# Patient Record
Sex: FEMALE | Race: ASIAN | NOT HISPANIC OR LATINO | Employment: UNEMPLOYED | URBAN - METROPOLITAN AREA
[De-identification: names, ages, dates, MRNs, and addresses within clinical notes are randomized per-mention and may not be internally consistent; named-entity substitution may affect disease eponyms.]

---

## 2020-07-01 ENCOUNTER — OFFICE VISIT (OUTPATIENT)
Dept: OBSTETRICS AND GYNECOLOGY | Facility: CLINIC | Age: 60
End: 2020-07-01
Payer: COMMERCIAL

## 2020-07-01 VITALS
HEIGHT: 59 IN | SYSTOLIC BLOOD PRESSURE: 121 MMHG | WEIGHT: 113 LBS | BODY MASS INDEX: 22.78 KG/M2 | DIASTOLIC BLOOD PRESSURE: 87 MMHG

## 2020-07-01 DIAGNOSIS — R56.9 SEIZURES: ICD-10-CM

## 2020-07-01 DIAGNOSIS — R73.03 PRE-DIABETES: ICD-10-CM

## 2020-07-01 DIAGNOSIS — Z78.0 MENOPAUSE: ICD-10-CM

## 2020-07-01 DIAGNOSIS — I63.9 CEREBROVASCULAR ACCIDENT (CVA), UNSPECIFIED MECHANISM: ICD-10-CM

## 2020-07-01 DIAGNOSIS — Z01.419 ENCOUNTER FOR WELL WOMAN EXAM WITH ROUTINE GYNECOLOGICAL EXAM: Primary | ICD-10-CM

## 2020-07-01 DIAGNOSIS — Z12.31 BREAST CANCER SCREENING BY MAMMOGRAM: ICD-10-CM

## 2020-07-01 DIAGNOSIS — I69.911 MEMORY DEFICIT AFTER CEREBROVASCULAR DISEASE: ICD-10-CM

## 2020-07-01 PROCEDURE — 99396 PR PREVENTIVE VISIT,EST,40-64: ICD-10-PCS | Mod: S$GLB,,, | Performed by: OBSTETRICS & GYNECOLOGY

## 2020-07-01 PROCEDURE — 99396 PREV VISIT EST AGE 40-64: CPT | Mod: S$GLB,,, | Performed by: OBSTETRICS & GYNECOLOGY

## 2020-07-01 RX ORDER — LEVETIRACETAM 1000 MG/1
TABLET ORAL
COMMUNITY
Start: 2020-06-24 | End: 2023-03-29

## 2020-07-01 RX ORDER — HYDROCHLOROTHIAZIDE 12.5 MG/1
TABLET ORAL
COMMUNITY
Start: 2020-04-01 | End: 2022-03-17

## 2020-07-01 RX ORDER — ATORVASTATIN CALCIUM 80 MG/1
TABLET, FILM COATED ORAL
COMMUNITY
Start: 2020-06-24 | End: 2022-03-17

## 2020-07-01 RX ORDER — LOSARTAN POTASSIUM 100 MG/1
TABLET ORAL
COMMUNITY
Start: 2020-04-01 | End: 2020-07-01

## 2020-07-01 RX ORDER — FLUTICASONE PROPIONATE 50 MCG
SPRAY, SUSPENSION (ML) NASAL
COMMUNITY
Start: 2020-04-01

## 2020-07-01 RX ORDER — LOSARTAN POTASSIUM 100 MG/1
100 TABLET ORAL DAILY
COMMUNITY

## 2020-07-01 NOTE — PROGRESS NOTES
CC: Well Woman (menopausal)    HPI:  Patient is a 60 y.o. year old   who presents for her well woman exam today.  History reviewed with patient and changes noted.  Patient without complaints or concerns today. Still has breast tenderness off and on but much beter than it has been in the past. Drinks 1 cup coffee each day.  No lumps or changes. .    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     Menopause     Pre-diabetes     Seizures 2016    started after stroke    Sinus disorder     Stroke 2016     CVA      x2       Past Surgical History:   Procedure Laterality Date    APPENDECTOMY      BILATERAL TUBAL LIGATION      BREAST BIOPSY Left      SECTION      x2       Social History     Tobacco Use    Smoking status: Never Smoker    Smokeless tobacco: Never Used   Substance Use Topics    Alcohol use: Never     Frequency: Never    Drug use: Never       Family History   Problem Relation Age of Onset    Hypertension Mother     Hypertension Sister     Rheum arthritis Sister     Diabetes Other         aunt unspecified which side of family       Review of patient's allergies indicates:   Allergen Reactions    Pcn [penicillins]          Current Outpatient Medications:     atorvastatin (LIPITOR) 80 MG tablet, , Disp: , Rfl:     cyanocobalamin (VITAMIN B-12) 100 MCG tablet, Take 100 mcg by mouth once daily., Disp: , Rfl:     fluticasone propionate (FLONASE) 50 mcg/actuation nasal spray, , Disp: , Rfl:     hydroCHLOROthiazide (HYDRODIURIL) 12.5 MG Tab, , Disp: , Rfl:     levETIRAcetam (KEPPRA) 1000 MG tablet, , Disp: , Rfl:     losartan (COZAAR) 100 MG tablet, Take 100 mg by mouth once daily., Disp: , Rfl:     pyridoxine, vitamin B6, (B-6) 100 MG Tab, Take 50 mg by mouth once daily., Disp: , Rfl:     OB History        2    Para   2    Term   2            AB        Living   2       SAB        TAB        Ectopic        Multiple         "Live Births   2                  GYN HX:    HX ABNL PAPS:  no    HX OF STDS:  none    MENOPAUSAL SINCE: YEAR: 2011    HRT USE: never and CONTRAINDICATED    HEALTH MAINTENANCE:  (PCP-Primary Doctor No)    LAST ANNUAL / PAP:   June 27, 2019  PAPresult: neg    LAST MMG:  November 13, 2019  neg at [facility]      LAST LABS:  more than 1 year  PCP    LAST COLON:neg-declines      ROS:  Review of Systems   Constitutional: Negative for activity change, appetite change, chills, fatigue, fever and unexpected weight change.   Respiratory: Negative for cough and shortness of breath.    Cardiovascular: Negative for chest pain and leg swelling.   Gastrointestinal: Negative for abdominal pain, bloating, blood in stool, constipation, diarrhea, nausea and vomiting.   Endocrine: Negative for hair loss and hot flashes.   Genitourinary: Negative for decreased libido, dyspareunia, dysuria, flank pain, frequency, hematuria, hot flashes, pelvic pain, urgency, vaginal bleeding, vaginal discharge, urinary incontinence, postmenopausal bleeding, vaginal dryness and vaginal odor.   Musculoskeletal: Negative for arthralgias and joint swelling.   Integumentary:  Negative for rash, acne, mole/lesion, breast mass, nipple discharge, breast skin changes and breast tenderness.   Neurological: Negative for headaches.   Psychiatric/Behavioral: Negative for depression and sleep disturbance. The patient is not nervous/anxious.    Breast: Negative for asymmetry, lump, mass, nipple discharge, skin changes and tenderness      VITALS:  No LMP recorded (lmp unknown). Patient is postmenopausal.  Vitals:    07/01/20 1353   BP: 121/87   BP Location: Left arm   Patient Position: Sitting   BP Method: Medium (Automatic)   Weight: 51.3 kg (113 lb)   Height: 4' 11" (1.499 m)     Body mass index is 22.82 kg/m².     PHYSICAL EXAM:  Physical Exam:   Constitutional: She is oriented to person, place, and time. She appears well-developed and well-nourished. She is cooperative. " No distress.    HENT:   Head: Normocephalic and atraumatic.     Neck: No thyroid mass present.    Cardiovascular: Normal rate.     Pulmonary/Chest: Effort normal. No respiratory distress. Right breast exhibits no inverted nipple, no mass, no nipple discharge, no skin change, no tenderness and no swelling. Left breast exhibits no inverted nipple, no mass, no nipple discharge, no skin change, no tenderness and no swelling. Breasts are symmetrical.        Abdominal: Soft. Normal appearance. She exhibits no distension. There is no abdominal tenderness.     Genitourinary:    Vagina and uterus normal.      Pelvic exam was performed with patient supine.   There is no rash, tenderness, lesion or injury on the right labia. There is no rash, tenderness, lesion or injury on the left labia. Uterus is not enlarged, not fixed, not tender and not experiencing uterine prolapse. Cervix is normal. Right adnexum displays no mass, no tenderness and no fullness. Left adnexum displays no mass, no tenderness and no fullness. No erythema, tenderness, bleeding, rectocele, cystocele or unspecified prolapse of vaginal walls in the vagina. Labial bartholins normal.Cervix exhibits no motion tenderness, no discharge and no friability.           Musculoskeletal: Moves all extremeties. No edema.       Neurological: She is alert and oriented to person, place, and time.    Skin: Skin is warm and dry. No lesion and no rash noted.    Psychiatric: She has a normal mood and affect. Her speech is normal and behavior is normal. Judgment and thought content normal.     *female chaperone present for exam    ASSESSMENT and PLAN:  Encounter for well woman exam with routine gynecological exam    Menopause    Cerebrovascular accident (CVA), unspecified mechanism    Seizures    Pre-diabetes    Breast cancer screening by mammogram  -     Mammo Digital Screening Bilat w/ Jorge; Future; Expected date: 07/14/2020    Memory deficit after cerebrovascular disease          FOLLOWUP:  Follow up in about 1 year (around 7/1/2021) for wwe.     COUNSELING:  Patient was counseled today on A.C.S. Pap guidelines and recommendations for yearly pelvic exam, monthly self breast exams, annual mammograms, and screening colonoscopy starting at age 50. Encouraged patient to see her PCP for other health maintenance.

## 2022-03-17 ENCOUNTER — OFFICE VISIT (OUTPATIENT)
Dept: OBSTETRICS AND GYNECOLOGY | Facility: CLINIC | Age: 62
End: 2022-03-17
Payer: COMMERCIAL

## 2022-03-17 VITALS
HEART RATE: 81 BPM | SYSTOLIC BLOOD PRESSURE: 118 MMHG | BODY MASS INDEX: 22.45 KG/M2 | HEIGHT: 59 IN | WEIGHT: 111.38 LBS | DIASTOLIC BLOOD PRESSURE: 73 MMHG

## 2022-03-17 DIAGNOSIS — Z13.820 ENCOUNTER FOR OSTEOPOROSIS SCREENING IN ASYMPTOMATIC POSTMENOPAUSAL PATIENT: ICD-10-CM

## 2022-03-17 DIAGNOSIS — N89.8 VAGINAL DISCHARGE: ICD-10-CM

## 2022-03-17 DIAGNOSIS — N64.4 BREAST PAIN: ICD-10-CM

## 2022-03-17 DIAGNOSIS — Z01.419 ENCOUNTER FOR WELL WOMAN EXAM WITH ROUTINE GYNECOLOGICAL EXAM: Primary | ICD-10-CM

## 2022-03-17 DIAGNOSIS — Z12.31 BREAST CANCER SCREENING BY MAMMOGRAM: ICD-10-CM

## 2022-03-17 DIAGNOSIS — Z78.0 ENCOUNTER FOR OSTEOPOROSIS SCREENING IN ASYMPTOMATIC POSTMENOPAUSAL PATIENT: ICD-10-CM

## 2022-03-17 DIAGNOSIS — N63.0 BREAST LUMP: ICD-10-CM

## 2022-03-17 PROCEDURE — 99396 PR PREVENTIVE VISIT,EST,40-64: ICD-10-PCS | Mod: S$GLB,,, | Performed by: OBSTETRICS & GYNECOLOGY

## 2022-03-17 PROCEDURE — 3074F SYST BP LT 130 MM HG: CPT | Mod: CPTII,S$GLB,, | Performed by: OBSTETRICS & GYNECOLOGY

## 2022-03-17 PROCEDURE — 3008F PR BODY MASS INDEX (BMI) DOCUMENTED: ICD-10-PCS | Mod: CPTII,S$GLB,, | Performed by: OBSTETRICS & GYNECOLOGY

## 2022-03-17 PROCEDURE — 3078F PR MOST RECENT DIASTOLIC BLOOD PRESSURE < 80 MM HG: ICD-10-PCS | Mod: CPTII,S$GLB,, | Performed by: OBSTETRICS & GYNECOLOGY

## 2022-03-17 PROCEDURE — 1159F MED LIST DOCD IN RCRD: CPT | Mod: CPTII,S$GLB,, | Performed by: OBSTETRICS & GYNECOLOGY

## 2022-03-17 PROCEDURE — 4010F PR ACE/ARB THEARPY RXD/TAKEN: ICD-10-PCS | Mod: CPTII,S$GLB,, | Performed by: OBSTETRICS & GYNECOLOGY

## 2022-03-17 PROCEDURE — 3074F PR MOST RECENT SYSTOLIC BLOOD PRESSURE < 130 MM HG: ICD-10-PCS | Mod: CPTII,S$GLB,, | Performed by: OBSTETRICS & GYNECOLOGY

## 2022-03-17 PROCEDURE — 1160F PR REVIEW ALL MEDS BY PRESCRIBER/CLIN PHARMACIST DOCUMENTED: ICD-10-PCS | Mod: CPTII,S$GLB,, | Performed by: OBSTETRICS & GYNECOLOGY

## 2022-03-17 PROCEDURE — 3078F DIAST BP <80 MM HG: CPT | Mod: CPTII,S$GLB,, | Performed by: OBSTETRICS & GYNECOLOGY

## 2022-03-17 PROCEDURE — 1159F PR MEDICATION LIST DOCUMENTED IN MEDICAL RECORD: ICD-10-PCS | Mod: CPTII,S$GLB,, | Performed by: OBSTETRICS & GYNECOLOGY

## 2022-03-17 PROCEDURE — 3008F BODY MASS INDEX DOCD: CPT | Mod: CPTII,S$GLB,, | Performed by: OBSTETRICS & GYNECOLOGY

## 2022-03-17 PROCEDURE — 4010F ACE/ARB THERAPY RXD/TAKEN: CPT | Mod: CPTII,S$GLB,, | Performed by: OBSTETRICS & GYNECOLOGY

## 2022-03-17 PROCEDURE — 1160F RVW MEDS BY RX/DR IN RCRD: CPT | Mod: CPTII,S$GLB,, | Performed by: OBSTETRICS & GYNECOLOGY

## 2022-03-17 PROCEDURE — 99396 PREV VISIT EST AGE 40-64: CPT | Mod: S$GLB,,, | Performed by: OBSTETRICS & GYNECOLOGY

## 2022-03-17 RX ORDER — CLINDAMYCIN PHOSPHATE 20 MG/G
CREAM VAGINAL NIGHTLY
Qty: 40 G | Refills: 0 | Status: SHIPPED | OUTPATIENT
Start: 2022-03-17

## 2022-03-17 RX ORDER — HYDROCHLOROTHIAZIDE 25 MG/1
25 TABLET ORAL DAILY
COMMUNITY
Start: 2022-02-24

## 2022-03-17 RX ORDER — IBUPROFEN 100 MG/5ML
1000 SUSPENSION, ORAL (FINAL DOSE FORM) ORAL DAILY
COMMUNITY

## 2022-03-17 RX ORDER — CETIRIZINE HYDROCHLORIDE 10 MG/1
10 TABLET ORAL DAILY
COMMUNITY
Start: 2022-02-24

## 2022-03-17 RX ORDER — ATORVASTATIN CALCIUM 40 MG/1
40 TABLET, FILM COATED ORAL DAILY
COMMUNITY
Start: 2022-02-24 | End: 2023-03-29

## 2022-03-17 NOTE — PROGRESS NOTES
"PROB VISIT (menopausal since )/breast lump/breast tenderness  Patient Care Team:  Primary Doctor No as PCP - General  Julio Zavala MD as Consulting Physician (Neurology)  Adeel Savage MD (Gastroenterology)    The patient's last visit with me was on 2020 for wwe    HPI:  Patient is a 61 y.o. who presents for her well woman exam today.  History reviewed with patient.   Patient reports a 2-3 month hx of a lump she can feel "most of the time but not all the time"in her left inner breast that is tender. Saw her pcp yesterday and he said it wasn't her heart causing that pain. Also recently diagnosed with chronic hep B. Her pcp did a RUQ us and was worried about her liver so referred her to Dr Adeel Savage. She was supposed to do her colonoscopy yesterday but  sick with flu. 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    SOCIAL HX:    reports that she has never smoked. She has never used smokeless tobacco. She reports that she does not drink alcohol and does not use drugs.    FAMILY HX:   family history includes Anxiety disorder in her sister; Diabetes " "in her other; Hypertension in her mother and sister; Rheum arthritis in her sister. .    ALLERGIES:  Pcn [penicillins]    Current Outpatient Medications:     ascorbic acid, vitamin C, (VITAMIN C) 1000 MG tablet, Take 1,000 mg by mouth once daily., Disp: , Rfl:     atorvastatin (LIPITOR) 40 MG tablet, Take 40 mg by mouth once daily., Disp: , Rfl:     cetirizine (ZYRTEC) 10 MG tablet, Take 10 mg by mouth once daily., Disp: , Rfl:     cyanocobalamin (VITAMIN B-12) 100 MCG tablet, Take 100 mcg by mouth once daily., Disp: , Rfl:     fluticasone propionate (FLONASE) 50 mcg/actuation nasal spray, , Disp: , Rfl:     hydroCHLOROthiazide (HYDRODIURIL) 25 MG tablet, Take 25 mg by mouth once daily., Disp: , Rfl:     levETIRAcetam (KEPPRA) 1000 MG tablet, , Disp: , Rfl:     losartan (COZAAR) 100 MG tablet, Take 100 mg by mouth once daily., Disp: , Rfl:     pyridoxine, vitamin B6, (B-6) 100 MG Tab, Take 50 mg by mouth once daily., Disp: , Rfl:     clindamycin (CLINDESSE) 2 % vaginal cream, Place vaginally every evening., Disp: 40 g, Rfl: 0    ROS:  CONST:  No fever, chills, fatigue or unexpected changes in weight  CV: No chest pain or palpitations  RESP:  No shortness of breath or cough  GI: No abd pain, vomiting, diarrhea, blood in stool, or changes in bowel mvmts  SKIN: No rashes or lesions  MUSCULOSKELETAL: No joint swelling or pain  PSYCH: No changes in mood or insomia  BREASTS: No asymmetry, nipple discharge, or skin changes +lump and pain in inner left breast  :  No dysuria, urgency, frequency, hematuria or incontinence          No vag dc, itching, odor or dryness          No pelvic pain, dyspareunia, or abnormal vaginal bleeding    VITALS:  Blood pressure 118/73, pulse 81, height 4' 11" (1.499 m), weight 50.5 kg (111 lb 6.4 oz).  Body mass index is 22.5 kg/m².     PHYSICAL EXAM-  APPEARANCE: Well appearing, in no acute distress.  NECK: Neck symmetric without masses or thyromegaly.  CV:  Normal rate  PULM: " Normal resp rate, no resp distress, normal resp effort  PSYCH:  Normal mood and affect, cooperative  SKIN: No rashes, lesions, or abnormal bruising  LYMPH: No inguinal or axillary adenopathy  ABD: Soft, without tenderness or masses. No palpable hernias or hsm  BREASTS: Symmetrical, no skin changes or lesions. No nipple dc +tender lump about 1cm in size in left medial breast- rt breast normal  PELVIC:  VULVA: No lesions. Normal female genitalia.  URETHRAL MEATUS: No masses, no prolapse.  BLADDER/ URETHRA: No masses or suprapubic tenderness  VAGINA: No lesions. No significant cystocele or rectocele. +atrophic changes +erythema +yellow vaginal discharge  CVX: No lesions,no cervical motion tenderness.  UTERUS: Normal size/shape, mobile, non-tender  ADNEXA: No masses, tenderness, or fullness.  ANUS/ PERINEUM: Normal tone.  No lesions.  *female chaperone present for entire exam    ASSESSMENT and PLAN:  Encounter for well woman exam with routine gynecological exam  -     Liquid-based pap smear, screening    Breast cancer screening by mammogram    Encounter for osteoporosis screening in asymptomatic postmenopausal patient  -     DXA Bone Density Spine And Hip; Future; Expected date: 03/31/2022    Breast lump  -     Mammo Digital Diagnostic Bilat with Jorge; Future; Expected date: 03/24/2022  -     US Breast Left Complete; Future; Expected date: 03/24/2022    Breast pain    Vaginal discharge  -     Genital Culture  -     clindamycin (CLINDESSE) 2 % vaginal cream; Place vaginally every evening.  Dispense: 40 g; Refill: 0       FOLLOWUP:  1 year for wwe or sooner prn    COUNSELING:  Patient was counseled today on recommendation for yearly pelvic exam, current Pap guidelines, self breast exams, annual screening mammograms, routine screening colonoscopy , and bone density testing.  Discussed vitamin D and calcium supplements as well as weight bearing exercise to decrease risks. Encouraged patient to see her PCP for other health  maintenance.

## 2022-03-25 ENCOUNTER — TELEPHONE (OUTPATIENT)
Dept: OBSTETRICS AND GYNECOLOGY | Facility: CLINIC | Age: 62
End: 2022-03-25
Payer: COMMERCIAL

## 2022-03-25 DIAGNOSIS — N76.0 ACUTE VAGINITIS: Primary | ICD-10-CM

## 2022-03-25 RX ORDER — LEVOFLOXACIN 500 MG/1
500 TABLET, FILM COATED ORAL DAILY
Qty: 5 TABLET | Refills: 0 | Status: SHIPPED | OUTPATIENT
Start: 2022-03-25 | End: 2022-03-30

## 2022-03-25 NOTE — TELEPHONE ENCOUNTER
----- Message from Amara Kiran MD sent at 3/25/2022  1:34 PM CDT -----  Please let pt know her pap is normal but her oneswab showed some Ecoli so I am sending in an oral abx to take care of that. She is currently on the clindamycin vaginal cream for her bv but it wont cover the ecoli

## 2022-03-25 NOTE — PROGRESS NOTES
Please let pt know her pap is normal but her oneswab showed some Ecoli so I am sending in an oral abx to take care of that. She is currently on the clindamycin vaginal cream for her bv but it wont cover the ecoli

## 2022-03-25 NOTE — TELEPHONE ENCOUNTER
----- Message from Aliyah Ham sent at 3/25/2022  3:01 PM CDT -----  Contact: self  Patient called and asked for a call back from a nurse at [phone]

## 2022-03-29 ENCOUNTER — TELEPHONE (OUTPATIENT)
Dept: OBSTETRICS AND GYNECOLOGY | Facility: CLINIC | Age: 62
End: 2022-03-29
Payer: COMMERCIAL

## 2022-03-29 NOTE — TELEPHONE ENCOUNTER
----- Message from Eulalia Olson sent at 3/29/2022  2:56 PM CDT -----  Regarding: order request  Contact: Pt  Pt is calling to get orders for mammogram and US sent to [facility]. Please call back at [phone]//thank you acc

## 2022-03-29 NOTE — TELEPHONE ENCOUNTER
Spoke with patient. Two pt identifiers confirmed. Notified patient  We sent her a coupon to get her clindamycin  For $27. Patient verbalized understanding.

## 2022-03-29 NOTE — TELEPHONE ENCOUNTER
Spoke with patient. Two pt identifiers confirmed. Notified patient of results of pos e coli cx on one swab.  rx sent to pharmacy. She states she picked it up but not the Clindamycin for the BV because it was almost $70. Please send a cheaper cream rx. Will fwd to Dr. Kiran for review. Patient verbalized understanding.

## 2022-04-25 ENCOUNTER — TELEPHONE (OUTPATIENT)
Dept: OBSTETRICS AND GYNECOLOGY | Facility: CLINIC | Age: 62
End: 2022-04-25
Payer: COMMERCIAL

## 2022-04-25 DIAGNOSIS — N64.4 BREAST PAIN: Primary | ICD-10-CM

## 2022-04-25 DIAGNOSIS — M81.0 OSTEOPOROSIS WITHOUT CURRENT PATHOLOGICAL FRACTURE, UNSPECIFIED OSTEOPOROSIS TYPE: Primary | ICD-10-CM

## 2022-04-25 NOTE — TELEPHONE ENCOUNTER
Spoke with patient. Two pt identifiers confirmed. Notified patient of results of dexa scan she sees someone at [facility] and we will mail a copy to her and she will make an apt there to discuss results and treatment. Patient verbalized understanding.

## 2022-04-25 NOTE — TELEPHONE ENCOUNTER
Spoke with patient. Two pt identifiers confirmed. Notified patient of results of nl breast imaging. She states she used to see Dr. Hidalgo and would like a referral to her again. Advised pt we will send the referral and they will contact her to schedule apt. Patient verbalized understanding.

## 2022-04-25 NOTE — TELEPHONE ENCOUNTER
----- Message from Amara Kiran MD sent at 4/25/2022  4:04 PM CDT -----  Please call pt and let her know her DEXA shows she has osteoporosis which will need  possible treatment and monitoring.  I can refer her to bone health and get that started, but does she not have a PCP either?  Does she need referall for pcp also?

## 2022-04-25 NOTE — PROGRESS NOTES
Please let pt know her mmg and us for breast pain appear normal but I can refer her to a breast specialist to evaluate further if she would like

## 2022-04-25 NOTE — PROGRESS NOTES
Please call pt and let her know her DEXA shows she has osteoporosis which will need  possible treatment and monitoring.  I can refer her to bone health and get that started, but does she not have a PCP either?  Does she need referall for pcp also?

## 2022-04-25 NOTE — TELEPHONE ENCOUNTER
----- Message from Amara Kiran MD sent at 4/24/2022  9:05 PM CDT -----  Please let pt know her mmg and us for breast pain appear normal but I can refer her to a breast specialist to evaluate further if she would like

## 2022-08-18 PROBLEM — M65.4 DE QUERVAIN'S TENOSYNOVITIS, RIGHT: Status: ACTIVE | Noted: 2022-08-18

## 2023-03-29 PROBLEM — D24.2 FIBROADENOMA OF LEFT BREAST IN FEMALE: Status: ACTIVE | Noted: 2022-07-06

## 2023-03-29 RX ORDER — SELENIUM SULFIDE 2.5 MG/100ML
LOTION TOPICAL
COMMUNITY
Start: 2022-06-29

## 2023-03-29 RX ORDER — LEVETIRACETAM 1000 MG/1
1000 TABLET ORAL
COMMUNITY
Start: 2022-05-21

## 2023-03-29 RX ORDER — ATORVASTATIN CALCIUM 40 MG/1
40 TABLET, FILM COATED ORAL
COMMUNITY
Start: 2022-05-21

## 2023-03-30 ENCOUNTER — OFFICE VISIT (OUTPATIENT)
Dept: OBSTETRICS AND GYNECOLOGY | Facility: CLINIC | Age: 63
End: 2023-03-30
Payer: MEDICARE

## 2023-03-30 VITALS
WEIGHT: 106 LBS | BODY MASS INDEX: 21.37 KG/M2 | HEIGHT: 59 IN | SYSTOLIC BLOOD PRESSURE: 169 MMHG | HEART RATE: 71 BPM | DIASTOLIC BLOOD PRESSURE: 100 MMHG

## 2023-03-30 DIAGNOSIS — Z12.31 BREAST CANCER SCREENING BY MAMMOGRAM: ICD-10-CM

## 2023-03-30 DIAGNOSIS — Z01.419 ENCOUNTER FOR WELL WOMAN EXAM WITH ROUTINE GYNECOLOGICAL EXAM: Primary | ICD-10-CM

## 2023-03-30 DIAGNOSIS — N64.4 BREAST PAIN: ICD-10-CM

## 2023-03-30 PROCEDURE — 3077F SYST BP >= 140 MM HG: CPT | Mod: CPTII,S$GLB,, | Performed by: OBSTETRICS & GYNECOLOGY

## 2023-03-30 PROCEDURE — 1159F MED LIST DOCD IN RCRD: CPT | Mod: CPTII,S$GLB,, | Performed by: OBSTETRICS & GYNECOLOGY

## 2023-03-30 PROCEDURE — 99396 PR PREVENTIVE VISIT,EST,40-64: ICD-10-PCS | Mod: S$GLB,,, | Performed by: OBSTETRICS & GYNECOLOGY

## 2023-03-30 PROCEDURE — 3077F PR MOST RECENT SYSTOLIC BLOOD PRESSURE >= 140 MM HG: ICD-10-PCS | Mod: CPTII,S$GLB,, | Performed by: OBSTETRICS & GYNECOLOGY

## 2023-03-30 PROCEDURE — 3080F PR MOST RECENT DIASTOLIC BLOOD PRESSURE >= 90 MM HG: ICD-10-PCS | Mod: CPTII,S$GLB,, | Performed by: OBSTETRICS & GYNECOLOGY

## 2023-03-30 PROCEDURE — 3008F PR BODY MASS INDEX (BMI) DOCUMENTED: ICD-10-PCS | Mod: CPTII,S$GLB,, | Performed by: OBSTETRICS & GYNECOLOGY

## 2023-03-30 PROCEDURE — 99396 PREV VISIT EST AGE 40-64: CPT | Mod: S$GLB,,, | Performed by: OBSTETRICS & GYNECOLOGY

## 2023-03-30 PROCEDURE — 3008F BODY MASS INDEX DOCD: CPT | Mod: CPTII,S$GLB,, | Performed by: OBSTETRICS & GYNECOLOGY

## 2023-03-30 PROCEDURE — 4010F ACE/ARB THERAPY RXD/TAKEN: CPT | Mod: CPTII,S$GLB,, | Performed by: OBSTETRICS & GYNECOLOGY

## 2023-03-30 PROCEDURE — 4010F PR ACE/ARB THEARPY RXD/TAKEN: ICD-10-PCS | Mod: CPTII,S$GLB,, | Performed by: OBSTETRICS & GYNECOLOGY

## 2023-03-30 PROCEDURE — 3080F DIAST BP >= 90 MM HG: CPT | Mod: CPTII,S$GLB,, | Performed by: OBSTETRICS & GYNECOLOGY

## 2023-03-30 PROCEDURE — 1159F PR MEDICATION LIST DOCUMENTED IN MEDICAL RECORD: ICD-10-PCS | Mod: CPTII,S$GLB,, | Performed by: OBSTETRICS & GYNECOLOGY

## 2023-03-30 NOTE — PROGRESS NOTES
"CC:  WELL WOMAN (menopausal since )  Patient Care Team:  Primary Doctor No as PCP - General  Julio Zavala MD as Consulting Physician (Neurology)  Adeel Henriquez MD (Gastroenterology)  [facility]  Samantha Hidalgo DO (Surgery)    Last visit with me was on  3/17/2022 for wwe    HPI:  Patient is a 62 y.o. who presents for her well woman exam today.  History reviewed with patient.   Patient is without complaints or concerns today. Saw Dr Hidalgo for breast pain and she wanted her to do another ultrasound and mmg per pt but pt never heard from them.  Discussed having her do them with Dr Hidalgo in [facility] doing them here at [facility] again. Pt says she hasnt had the breast pain in " a long time" so will do here.    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    SOCIAL HX:    reports that she has never smoked. She has never used smokeless tobacco. She reports that she does not drink alcohol and does not use drugs.    FAMILY HX:   family history includes Anxiety disorder in her sister; Diabetes in an other family member; Hypertension in her mother and sister; Rheum arthritis in her sister. .    ALLERGIES:  Pcn " "[penicillins]    Current Outpatient Medications   Medication Instructions    ascorbic acid (vitamin C) (VITAMIN C) 1,000 mg, Oral, Daily    atorvastatin (LIPITOR) 40 mg, Oral    cetirizine (ZYRTEC) 10 mg, Oral, Daily    clindamycin (CLINDESSE) 2 % vaginal cream Vaginal, Nightly    cyanocobalamin (VITAMIN B-12) 100 mcg, Oral, Daily    fluticasone propionate (FLONASE) 50 mcg/actuation nasal spray No dose, route, or frequency recorded.    hydroCHLOROthiazide (HYDRODIURIL) 25 mg, Oral, Daily    levETIRAcetam (KEPPRA) 1,000 mg, Oral    losartan (COZAAR) 100 mg, Oral, Daily    pyridoxine (vitamin B6) (B-6) 50 mg, Oral, Daily    selenium sulfide 2.5 % Lotn LATHER ON SCALP, LEAVE ON 10 MINUTES THEN RINSE. USE 2-3 TIMES A WEEK.     ROS:  CONST:  No fever, chills, fatigue or unexpected changes in weight   CV: No chest pain or palpitations   RESP:  No shortness of breath or cough   GI: No abd pain, vomiting, diarrhea, blood in stool, or changes in bowel mvmts   SKIN: No rashes or lesions  MUSCULOSKELETAL: No joint swelling or pain   PSYCH: No changes in mood or insomia   BREASTS: No asymmetry, lumps, pain, nipple discharge, or skin changes   :  No dysuria, urgency, frequency, hematuria or incontinence           No vag dc, itching, odor or dryness           No pelvic pain, dyspareunia, or abnormal vaginal bleeding     VITALS:  Blood pressure (!) 169/100, pulse 71, height 4' 11" (1.499 m), weight 48.1 kg (106 lb).  Body mass index is 21.41 kg/m².     PHYSICAL EXAM-  APPEARANCE: Well appearing, in no acute distress.   NECK: Neck symmetric   CV:  Normal rate   PULM: Normal resp rate, no resp distress, normal resp effort   PSYCH:  Normal mood and affect, cooperative   SKIN: No rashes, lesions, or abnormal bruising   LYMPH: No inguinal or axillary adenopathy   ABD: Soft, without tenderness or masses.   BREAST: Symmetrical, no nipple changes, no skin changes, No palpable masses   PELVIC:  VULVA: Normal female genitalia. No lesions. "   URETHRAL MEATUS: No masses, no significant prolapse.   BLADDER/ URETHRA: No masses or suprapubic tenderness   VAGINA: No lesions. +atrophic changes. No discharge   CVX: No lesions   UTERUS: Normal size/shape, mobile, non-tender   ADNEXA: No masses, tenderness, or fullness   ANUS/ PERINEUM: Normal tone.  No lesions.     *female chaperone present for entire exam    ASSESSMENT and PLAN:  Encounter for well woman exam with routine gynecological exam  -     Liquid-based pap smear, screening    Breast cancer screening by mammogram  -     Mammo Digital Screening Bilat w/ Jorge; Future; Expected date: 04/12/2023    Breast pain  -     US Breast Left Complete; Future; Expected date: 04/06/2023  -     Mammo Digital Diagnostic Bilat with Jorge; Future; Expected date: 04/06/2023     FOLLOWUP:  1 year for wwe or sooner prn    COUNSELING:  Patient was counseled today on recommendation for yearly pelvic exam, current Pap guidelines, self breast exams, annual screening mammograms, routine screening colonoscopy , and bone density testing.  Discussed vitamin D and calcium supplements as well as weight bearing exercise to decrease risks. Encouraged patient to see her PCP for other health maintenance.

## 2023-04-05 LAB — Lab: NORMAL

## 2023-04-27 DIAGNOSIS — R92.8 ABNORMAL SCREENING MAMMOGRAM: Primary | ICD-10-CM

## 2023-04-27 NOTE — PROGRESS NOTES
Please let pt know the radiologist is recommending an ultrasound guided biopsy of an area of her breast. She did this at [facility] so they may have already contacted her and set that up. If not, we can send referral to [facility] as well

## 2023-05-09 ENCOUNTER — PATIENT MESSAGE (OUTPATIENT)
Dept: RESEARCH | Facility: HOSPITAL | Age: 63
End: 2023-05-09
Payer: MEDICARE

## 2024-04-11 ENCOUNTER — TELEPHONE (OUTPATIENT)
Dept: OBSTETRICS AND GYNECOLOGY | Facility: CLINIC | Age: 64
End: 2024-04-11
Payer: MEDICARE

## 2024-04-11 NOTE — TELEPHONE ENCOUNTER
----- Message from Patti Varela sent at 4/11/2024  1:35 PM CDT -----  Contact: PT  Patient Call Back    Patient Name: Madeline Paz   Nature of Call:[phone]   Call Back Number:[phone]   Additional Information: n/a

## 2024-04-24 NOTE — PROGRESS NOTES
CC:  WELL WOMAN (menopausal since )  Patient Care Team:  No, Primary Doctor as PCP - Julio Laws MD as Consulting Physician (Neurology)  Adeel Savage MD (Gastroenterology)  [facility] as Current PCP  Yanira Chapa MD as Consulting Physician (General Surgery)    Last visit with me was on  3/30/2023 for wwe    HPI:  Patient is a 63 y.o. who presents for her well woman exam today.  History reviewed with patient.   Patient is without complaints or concerns today- sometimes feels pain in her medial upper rt breast but thinks it may be the bone underneath. Has had biopsy and all benign but still worries her sometimes. Also reached for soap in shower last night and pulled something in her rt shoulder. Call dr schroeder's office for appt bc has had bilateral rotatoe cuff problems in the past that she has seen him for.  Declines a pelvic exam this year but does want a breast exam. 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    SOCIAL HX:    reports that she has never smoked. She has never used smokeless tobacco. She reports that she does not drink alcohol and does not use drugs.    Current Outpatient Medications   Medication Instructions    ascorbic  acid (vitamin C) (VITAMIN C) 1,000 mg, Oral, Daily    atorvastatin (LIPITOR) 40 mg, Oral    cetirizine (ZYRTEC) 10 mg, Oral, Daily    cyanocobalamin (VITAMIN B-12) 100 mcg, Oral, Daily    fluticasone propionate (FLONASE) 50 mcg/actuation nasal spray No dose, route, or frequency recorded.    hydroCHLOROthiazide (HYDRODIURIL) 25 mg, Oral, Daily    levETIRAcetam (KEPPRA) 1,000 mg, Oral    losartan (COZAAR) 100 mg, Oral, Daily    pyridoxine (vitamin B6) (B-6) 50 mg, Oral, Daily     VITALS:  Blood pressure (!) 167/90, weight 50.3 kg (110 lb 12.8 oz).  Body mass index is 22.38 kg/m².     PHYSICAL EXAM-  APPEARANCE: Well appearing, in no acute distress.   CV/PULM: No resp distress, normal resp effort   PSYCH:  Normal mood and affect, cooperative   SKIN: No rashes, lesions, or abnormal bruising   ABD: Soft, without tenderness or masses.   BREAST:no masses, no lad, no tenderness, no asymmetry  PELVIC:  pt declined today          *patient verbally consented for exam and female chaperone present for entire exam     ASSESSMENT and PLAN:  Encounter for well woman exam with routine gynecological exam  -     Cancel: Liquid-based pap smear, screening    Breast cancer screening by mammogram  -     Mammo Digital Screening Bilanita w/ Jorge; Future; Expected date: 05/08/2024    Menopausal state       FOLLOWUP:  1 year for wwe or sooner prn    COUNSELING:  Patient was counseled today on recommendation for yearly pelvic exam, current Pap guidelines, self breast exams, annual screening mammograms, routine screening colonoscopy , and bone density testing.  Discussed vitamin D and calcium supplements as well as weight bearing exercise to decrease risks. Encouraged patient to see her PCP for other health maintenance.

## 2024-04-25 ENCOUNTER — OFFICE VISIT (OUTPATIENT)
Dept: OBSTETRICS AND GYNECOLOGY | Facility: CLINIC | Age: 64
End: 2024-04-25
Payer: COMMERCIAL

## 2024-04-25 VITALS
DIASTOLIC BLOOD PRESSURE: 90 MMHG | SYSTOLIC BLOOD PRESSURE: 167 MMHG | WEIGHT: 110.81 LBS | BODY MASS INDEX: 22.38 KG/M2

## 2024-04-25 DIAGNOSIS — Z01.419 ENCOUNTER FOR WELL WOMAN EXAM WITH ROUTINE GYNECOLOGICAL EXAM: Primary | ICD-10-CM

## 2024-04-25 DIAGNOSIS — N95.1 MENOPAUSAL STATE: ICD-10-CM

## 2024-04-25 DIAGNOSIS — Z12.31 BREAST CANCER SCREENING BY MAMMOGRAM: ICD-10-CM

## 2024-04-25 PROCEDURE — 3077F SYST BP >= 140 MM HG: CPT | Mod: CPTII,S$GLB,, | Performed by: OBSTETRICS & GYNECOLOGY

## 2024-04-25 PROCEDURE — 3080F DIAST BP >= 90 MM HG: CPT | Mod: CPTII,S$GLB,, | Performed by: OBSTETRICS & GYNECOLOGY

## 2024-04-25 PROCEDURE — 4010F ACE/ARB THERAPY RXD/TAKEN: CPT | Mod: CPTII,S$GLB,, | Performed by: OBSTETRICS & GYNECOLOGY

## 2024-04-25 PROCEDURE — 3008F BODY MASS INDEX DOCD: CPT | Mod: CPTII,S$GLB,, | Performed by: OBSTETRICS & GYNECOLOGY

## 2024-04-25 PROCEDURE — 99396 PREV VISIT EST AGE 40-64: CPT | Mod: S$GLB,,, | Performed by: OBSTETRICS & GYNECOLOGY

## 2024-04-25 PROCEDURE — 1159F MED LIST DOCD IN RCRD: CPT | Mod: CPTII,S$GLB,, | Performed by: OBSTETRICS & GYNECOLOGY

## 2024-10-07 ENCOUNTER — TELEPHONE (OUTPATIENT)
Dept: OBSTETRICS AND GYNECOLOGY | Facility: CLINIC | Age: 64
End: 2024-10-07
Payer: MEDICARE

## 2024-10-07 NOTE — TELEPHONE ENCOUNTER
----- Message from Kimberli sent at 10/7/2024 10:51 AM CDT -----  Contact: Madeline  Type:  Needs Medical Advice    Who Called: Madeline  Symptoms (please be specific): Cannot urinate/possible UTI/itchiness/slight pain in lower right stomach   How long has patient had these symptoms: Couple of weeks  Pharmacy name and phone #:    [id number] - [place], [place] - [address]   [address]  [place] [address]  Phone: [phone] Fax: [phone]  Would the patient rather a call back or a response via [facility]? call  Best Call Back Number: [phone]   Additional Information: Patient reports possibly having infection and request to discuss further regarding scheduling visit and/or receiving medication. Please give patient a call back after 2pm to 3 pm today, as requested. If no answer, please leave a voice message.   Thank you,  GILMAR

## 2024-10-22 ENCOUNTER — PATIENT MESSAGE (OUTPATIENT)
Dept: RESEARCH | Facility: HOSPITAL | Age: 64
End: 2024-10-22
Payer: MEDICARE

## 2025-04-29 RX ORDER — DICLOFENAC SODIUM 10 MG/G
2 GEL TOPICAL
COMMUNITY
Start: 2025-02-27

## 2025-04-29 RX ORDER — LEVETIRACETAM 1000 MG/1
1000 TABLET ORAL EVERY 12 HOURS
COMMUNITY
Start: 2025-02-27

## 2025-04-29 RX ORDER — PNV NO.95/FERROUS FUM/FOLIC AC 28MG-0.8MG
100 TABLET ORAL EVERY MORNING
COMMUNITY
Start: 2025-02-27

## 2025-04-29 RX ORDER — IBUPROFEN 100 MG/5ML
1000 SUSPENSION, ORAL (FINAL DOSE FORM) ORAL EVERY MORNING
COMMUNITY
Start: 2025-02-27

## 2025-04-29 RX ORDER — CETIRIZINE HYDROCHLORIDE 10 MG/1
10 TABLET ORAL
COMMUNITY
Start: 2025-02-27

## 2025-04-29 RX ORDER — LOSARTAN POTASSIUM 100 MG/1
100 TABLET ORAL EVERY MORNING
COMMUNITY
Start: 2025-02-27

## 2025-04-29 RX ORDER — FLUTICASONE PROPIONATE 50 MCG
2 SPRAY, SUSPENSION (ML) NASAL
COMMUNITY
Start: 2025-02-27

## 2025-04-29 RX ORDER — ATORVASTATIN CALCIUM 40 MG/1
40 TABLET, FILM COATED ORAL NIGHTLY
COMMUNITY
Start: 2025-02-27

## 2025-04-29 RX ORDER — HYDROCHLOROTHIAZIDE 25 MG/1
25 TABLET ORAL EVERY MORNING
COMMUNITY
Start: 2025-02-27

## 2025-04-30 PROBLEM — B18.1 CHRONIC HEPATITIS B: Status: ACTIVE | Noted: 2025-03-20

## 2025-05-01 ENCOUNTER — OFFICE VISIT (OUTPATIENT)
Dept: OBSTETRICS AND GYNECOLOGY | Facility: CLINIC | Age: 65
End: 2025-05-01
Payer: MEDICARE

## 2025-05-01 VITALS
WEIGHT: 99 LBS | SYSTOLIC BLOOD PRESSURE: 125 MMHG | HEIGHT: 59 IN | BODY MASS INDEX: 19.96 KG/M2 | DIASTOLIC BLOOD PRESSURE: 76 MMHG

## 2025-05-01 DIAGNOSIS — Z78.0 ENCOUNTER FOR OSTEOPOROSIS SCREENING IN ASYMPTOMATIC POSTMENOPAUSAL PATIENT: ICD-10-CM

## 2025-05-01 DIAGNOSIS — Z12.31 BREAST CANCER SCREENING BY MAMMOGRAM: ICD-10-CM

## 2025-05-01 DIAGNOSIS — Z01.419 ENCOUNTER FOR WELL WOMAN EXAM WITH ROUTINE GYNECOLOGICAL EXAM: Primary | ICD-10-CM

## 2025-05-01 DIAGNOSIS — Z13.820 ENCOUNTER FOR OSTEOPOROSIS SCREENING IN ASYMPTOMATIC POSTMENOPAUSAL PATIENT: ICD-10-CM

## 2025-05-01 PROCEDURE — 3044F HG A1C LEVEL LT 7.0%: CPT | Mod: CPTII,,, | Performed by: OBSTETRICS & GYNECOLOGY

## 2025-05-01 PROCEDURE — 3078F DIAST BP <80 MM HG: CPT | Mod: CPTII,,, | Performed by: OBSTETRICS & GYNECOLOGY

## 2025-05-01 PROCEDURE — 3074F SYST BP LT 130 MM HG: CPT | Mod: CPTII,,, | Performed by: OBSTETRICS & GYNECOLOGY

## 2025-05-01 PROCEDURE — 4010F ACE/ARB THERAPY RXD/TAKEN: CPT | Mod: CPTII,,, | Performed by: OBSTETRICS & GYNECOLOGY

## 2025-05-01 PROCEDURE — 3008F BODY MASS INDEX DOCD: CPT | Mod: CPTII,,, | Performed by: OBSTETRICS & GYNECOLOGY

## 2025-05-01 PROCEDURE — 1159F MED LIST DOCD IN RCRD: CPT | Mod: CPTII,,, | Performed by: OBSTETRICS & GYNECOLOGY

## 2025-05-01 PROCEDURE — 99396 PREV VISIT EST AGE 40-64: CPT | Mod: S$PBB,,, | Performed by: OBSTETRICS & GYNECOLOGY

## 2025-05-01 NOTE — PROGRESS NOTES
CC:  WELL WOMAN (menopausal since )  Patient Care Team:  No, Primary Doctor as PCP - Julio Laws MD as Consulting Physician (Neurology)  Adeel Henriquez MD (Gastroenterology)  [facility] as Current PCP  Yanira Chapa MD as Consulting Physician (General Surgery)        HPI:  Patient is a 64 y.o. who presents for her well woman exam today.  History reviewed with patient. Doing well. Declines pelvic exam as she has no comcerns  Patient is without complaints or concerns today.     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    SOCIAL HX:    reports that she has never smoked. She has never used smokeless tobacco. She reports that she does not drink alcohol and does not use drugs.    Current Outpatient Medications   Medication Instructions    ascorbic acid (vitamin C) (VITAMIN C) 1,000 mg, Oral, Daily    ascorbic acid (vitamin C) (VITAMIN C) 1,000 mg, Every morning    atorvastatin (LIPITOR) 40 mg, Oral    atorvastatin (LIPITOR) 40 mg, Nightly    cetirizine (ZYRTEC) 10 mg, Oral, Daily    cetirizine (ZYRTEC) 10 mg    cyanocobalamin (VITAMIN B-12) 100 mcg, Oral, Daily    cyanocobalamin (VITAMIN B-12) 100 mcg, Every morning    diclofenac sodium (VOLTAREN) 2 g    fluticasone propionate (FLONASE) 50 mcg/actuation  "nasal spray No dose, route, or frequency recorded.    fluticasone propionate (FLONASE) 50 mcg/actuation nasal spray 2 sprays    hydroCHLOROthiazide (HYDRODIURIL) 25 mg, Oral, Daily    hydroCHLOROthiazide (HYDRODIURIL) 25 mg, Every morning    levETIRAcetam (KEPPRA) 1,000 mg, Oral    levETIRAcetam (KEPPRA) 1,000 mg, Every 12 hours    losartan (COZAAR) 100 mg, Oral, Daily    losartan (COZAAR) 100 mg, Every morning    pyridoxine (vitamin B6) (B-6) 50 mg, Daily     VITALS:  Blood pressure 125/76, height 4' 11" (1.499 m), weight 44.9 kg (99 lb).  Body mass index is 20 kg/m².     PHYSICAL EXAM-  APPEARANCE: Well appearing, in no acute distress.   CV/PULM: No resp distress, normal resp effort   PSYCH:  Normal mood and affect, cooperative   SKIN: No rashes, lesions, or abnormal bruising   ABD: Soft, without tenderness or masses.   BREAST: no lumps, masses, skin changes, tenderness  PELVIC: pt declined            *patient verbally consented for exam and female chaperone present for entire exam     ASSESSMENT and PLAN:  Encounter for well woman exam with routine gynecological exam    Breast cancer screening by mammogram  -     Mammo Digital Screening Bilat w/ Jorge ([facility]PD); Future; Expected date: 05/14/2025    Encounter for osteoporosis screening in asymptomatic postmenopausal patient  -     DXA Bone Density Axial Skeleton 1 or more sites; Future; Expected date: 05/14/2025       FOLLOWUP:  1 year for wwe or sooner prn    COUNSELING:  Patient was counseled today on recommendation for yearly pelvic exam, current Pap guidelines, self breast exams, annual screening mammograms, routine screening colonoscopy , and bone density testing.  Discussed vitamin D and calcium supplements as well as weight bearing exercise to decrease risks. Encouraged patient to see her PCP for other health maintenance.       "

## 2025-08-19 ENCOUNTER — TELEPHONE (OUTPATIENT)
Dept: OBSTETRICS AND GYNECOLOGY | Facility: CLINIC | Age: 65
End: 2025-08-19
Payer: MEDICARE